# Patient Record
Sex: MALE | Race: WHITE
[De-identification: names, ages, dates, MRNs, and addresses within clinical notes are randomized per-mention and may not be internally consistent; named-entity substitution may affect disease eponyms.]

---

## 2017-09-18 ENCOUNTER — HOSPITAL ENCOUNTER (OUTPATIENT)
Dept: HOSPITAL 61 - PCVCIMAG | Age: 67
Discharge: HOME | End: 2017-09-18
Attending: INTERNAL MEDICINE
Payer: MEDICARE

## 2017-09-18 DIAGNOSIS — K52.9: ICD-10-CM

## 2017-09-18 DIAGNOSIS — E78.5: ICD-10-CM

## 2017-09-18 DIAGNOSIS — I25.10: ICD-10-CM

## 2017-09-18 DIAGNOSIS — Z79.4: ICD-10-CM

## 2017-09-18 DIAGNOSIS — I34.0: ICD-10-CM

## 2017-09-18 DIAGNOSIS — I65.23: Primary | ICD-10-CM

## 2017-09-18 DIAGNOSIS — E11.9: ICD-10-CM

## 2017-09-18 DIAGNOSIS — I10: ICD-10-CM

## 2017-09-18 PROCEDURE — 93017 CV STRESS TEST TRACING ONLY: CPT

## 2017-09-18 PROCEDURE — A9500 TC99M SESTAMIBI: HCPCS

## 2017-09-18 PROCEDURE — 93306 TTE W/DOPPLER COMPLETE: CPT

## 2017-09-18 PROCEDURE — 78452 HT MUSCLE IMAGE SPECT MULT: CPT

## 2017-09-18 PROCEDURE — 93880 EXTRACRANIAL BILAT STUDY: CPT

## 2017-09-18 NOTE — PCVCIMAG
--------------- APPROVED REPORT --------------





Exam: Nuclear Stress Test

Indication: Dyspnea, Chest pain

Patient Location: Out-Patient

Stress Nurse: Annalise Correa RN

NM Tech:YADIRA ReinosoMT



Ht: 5 ft 10 in Wt: 195 lbs BSA:  2.07 m2

HR: 61 bpm                      BP: 183/89 mmHg         BMI:  

27.9

Rhythm:  NSR



Medical History

Medical History: HTN, Hyperlipidemia, CAD, Diabetic  Insulin, CVD, 

CAROTID BRUIT, Age

Medications: ASA, Lipitor, Losartan-HCTZ

Allergies: Januvia

Previous Cardiac Procedures: PCI

Pretest Chest Pain Characteristics: No chest pain

Exercise History: Physically active

Physical Disabilities: Back



NM EXAM: Myocardial Perfusion REST/STRESS

Imaging Protocol: Rest Tc-99m/Stress Tc-99m 1 day



Resting Data

Rest SPECT myocardial perfusion imaging was performed in supine 

position 45 minutes following the intravenous injection of 11 mCi of 

Tc-99m Sestamibi.

Time of rest injection: 0930     Date: 09/18/2017

Administration Route: IV

Administration Site: Right Arm



Pharmacologic Stress

Pharmacologic stress test was performed by injecting Regadenoson 0.4 

mg IV push followed by the intravenous injection of 32.4 mCi of 

Tc-99m Sestamibi.

Time of stress injection: 1100     Date: 09/18/2017

Administration Route: IV

Administration Site: Left Arm

Gated Stress SPECT was performed 45 minutes after stress 

injection.

The images were gated to evaluate regional wall motion and calculate 

left ventricular ejection fraction. 



Study Quality

Study: Good



Study Data

Post stress, the left ventricular ejection was 67%..

SSS: 0

SRS: 0

SDS: 0

TID = 1.05.



Perfusion

No evidence of stress induced ischemia or prior myocardial 

infarction.



Wall Motion

Normal left ventricular size and function with no regional wall 

motion abnormalities.



Nuclear Conclusion

No evidence of stress induced ischemia or prior myocardial 

infarction.

Normal left ventricular size and function with no regional wall 

motion abnormalities.

Post stress, the left ventricular ejection was 67%.. 

No prior study available for comparison.



Interpreted by:  Jamshid Yu MD

Electronically Approved: 09/18/2017 14:03:05



Stress Test Details

Stress Test:  Pharmacologic stress testing performed using 0.4 mg of 

regadenoson per 5 mL given IV over 10 seconds.

  Reason for pharmacologic stress test: physical 

limitation.

HR

Resting HR:            61 bpmMax Heart Rate (APMHR): 153 bpm 

Max HR Achieved:  90 bpmTarget HR (85% APMHR): 130 bpm

% of APMHR:         58

Recovery HR:            75 bpm



BP

Resting BP:  183/89 mmHg

Max BP:       151/79 mmHg



ECG

Resting ECG:  Sinus Rhythm

Stress ECG:     Sinus Rhythm, Sinus Rhythm, NSSTT 

changes

Recovery ECG: Sinus Rhythm, Sinus Bradycardia



Clinical

Reason for Termination: Completed protocol

Stress Symptoms: Abdominal discomfort

Exercise duration: 0 min 55 sec

Symptoms resolved during recovery with caffeine.

## 2017-09-18 NOTE — PCVCIMAG
--------------- APPROVED REPORT --------------





Study performed:  2017 08:10:14



EXAM: Comprehensive 2D, Doppler, and color-flow 

Echocardiogram

Patient Location: Echo lab

Status:  routine



BSA:         2.07

HR: 64 bpmBP:          118/78 mmHg

Rhythm: NSR



Other Information 

Study Quality: Adequate



Indications

Diabetes

CAD

Chest Pain



2D Dimensions

LVEF(%):  43.55 (&gt;50%)

IVSd:  11.30 (7-11mm)

LVDd:  40.59 mm

PWd:  10.95 (7-11mm)

LVDs:  31.99 (25-40mm)

Left Atrium:  37.75 (27-40mm)

Aortic Root:  33.01 mm

LV Single Plane 4CH:  60.26 %

LV Single Plane 2CH:  60.52 %Arellano's LVEF:  60.39 %

Biplane EF:  61.5 %



Volumes

Left Atrial Volume (Systole)

Single Plane 4CH:  38.41 mLSingle Plane 2CH:  58.86 mL

LA ESV Index:  23.00 mL/m2



Aortic Valve

AoV Peak Mitesh.:  1.27 m/s

AO Peak Gr.:  6.44 mmHgLVOT Max P.02 mmHg

LVOT Max V:  1.00 m/s



Mitral Valve

E/A Ratio:  0.6

MV Decel. Time:  300.85 ms

MV E Max Mitesh.:  0.53 m/s

MV A Mitesh.:  0.95 m/s

IVRT:  152.25 ms



Pulmonary Valve

PV Peak Mitesh.:  0.91 m/sPV Peak Gr.:  3.29 mmHg



Pulmonary Vein

P Vein S:    0.22 m/sP Vein A:  0.26 m/s

P Vein D:   0.27 m/sP Vein A Dur.:  162.6 msec

P Vein S/D Ratio:  0.81



Tricuspid Valve

TR Peak Mitesh.:  2.41 m/s

TR Peak Gr.:  23.25 mmHg



Left Ventricle

The left ventricle is normal size. There is normal LV segmental wall 

motion. There is normal left ventricular wall thickness. Left 

ventricular systolic function is normal. The left ventricular 

ejection fraction is within the normal range. LVEF is 55-60%. Grade I 

- abnormal relaxation pattern.



Right Ventricle

The right ventricle is normal size. The right ventricular systolic 

function is normal.



Atria

The left atrium size is normal. The right atrium size is 

normal.



Aortic Valve

The aortic valve is normal in structure. No aortic regurgitation is 

present. There is no aortic valvular stenosis.



Mitral Valve

The mitral valve is normal in structure. There is no mitral valve 

regurgitation noted. No evidence of mitral valve stenosis.



Tricuspid Valve

The tricuspid valve is normal in structure. Trace tricuspid 

regurgitation with PAP of 30 mmHg.



Pulmonic Valve

The pulmonary valve is normal in structure. There is no pulmonic 

valvular regurgitation.



Great Vessels

The aortic root is normal in size. IVC is normal in size and 

collapses with &gt;50% inspiration



Pericardium

There is no pericardial effusion.



&lt;Conclusion&gt;

The left ventricle is normal size.

Left ventricular systolic function is normal.

The left ventricular ejection fraction is within the normal 

range.

LVEF is 55-60%.

Grade I - abnormal relaxation pattern.

The right ventricle is normal size.

The left atrium size is normal.

The aortic valve is normal in structure.

There is no mitral valve regurgitation noted.

Trace tricuspid regurgitation with PAP of 30 mmHg.

There is no pericardial effusion.

## 2017-09-18 NOTE — PCVCIMAG
--------------- APPROVED REPORT --------------





Laterality: Bilateral



Patient Location: Out-Patient



Indications

Stenosis



Doppler Spectral Velocity Analysis

PSV  /  EDVPSV  /  EDV

ECA (R)     84 / 8 cm/sECA (L)     109 / 11 cm/s

dICA (R)    80 / 38 cm/sdICA (L)     76 / 35 cm/s

Ruddy (R)     61 / 26 cm/smICA (L)     115 / 47 cm/s

pICA (R)     55 / 19 cm/spICA (L)     53 / 17 cm/s

Bulb (R)        64 / 14 cm/sBulb (L)         67 / 21 cm/s

dCCA (R)     77 / 18 cm/sdCCA (L)     83 / 18 cm/s

mCCA (R)    84 / 18 cm/smCCA (L)    93 / 24 cm/s

Vert (R)     53 / 9 cm/sVert (L)     63 / 25 cm/s

ICA/CCA     ICA/CCA     



Findings

The right carotid bulb has mild calcified plaque.

The right proximal internal carotid artery shows &lt;40% 

stenosis.

The right common carotid artery shows no significant stenosis.

The right external carotid artery shows no significant stenosis.

The left carotid bulb has mild  plaque.

The left mid internal carotid artery shows &lt;40% stenosis.

The left common carotid artery shows no significant stenosis.

The left external carotid artery shows no significant 

stenosis.



Conclusion

1.  Right internal carotid artery stenosis (&lt;40%)

2.  Left mid internal carotid artery stenosis (&lt;40%)

3.  Antegrade vertebral flow

## 2018-08-08 ENCOUNTER — HOSPITAL ENCOUNTER (OUTPATIENT)
Dept: HOSPITAL 61 - PCVCCLINIC | Age: 68
Discharge: HOME | End: 2018-08-08
Attending: INTERNAL MEDICINE
Payer: MEDICARE

## 2018-08-08 DIAGNOSIS — I25.10: Primary | ICD-10-CM

## 2018-08-08 DIAGNOSIS — I10: ICD-10-CM

## 2018-08-08 DIAGNOSIS — E11.9: ICD-10-CM

## 2018-08-08 DIAGNOSIS — Z79.82: ICD-10-CM

## 2018-08-08 DIAGNOSIS — Z79.4: ICD-10-CM

## 2018-08-08 DIAGNOSIS — E78.00: ICD-10-CM

## 2018-08-08 DIAGNOSIS — I77.9: ICD-10-CM

## 2018-08-08 PROCEDURE — 93005 ELECTROCARDIOGRAM TRACING: CPT

## 2019-04-10 ENCOUNTER — HOSPITAL ENCOUNTER (OUTPATIENT)
Dept: HOSPITAL 61 - PCVCCLINIC | Age: 69
Discharge: HOME | End: 2019-04-10
Attending: INTERNAL MEDICINE
Payer: MEDICARE

## 2019-04-10 DIAGNOSIS — M54.5: ICD-10-CM

## 2019-04-10 DIAGNOSIS — Z88.8: ICD-10-CM

## 2019-04-10 DIAGNOSIS — E11.9: ICD-10-CM

## 2019-04-10 DIAGNOSIS — I10: ICD-10-CM

## 2019-04-10 DIAGNOSIS — E78.00: ICD-10-CM

## 2019-04-10 DIAGNOSIS — I25.10: Primary | ICD-10-CM

## 2019-04-10 DIAGNOSIS — G89.29: ICD-10-CM

## 2019-04-10 DIAGNOSIS — Z79.4: ICD-10-CM

## 2019-04-10 DIAGNOSIS — R94.31: ICD-10-CM

## 2019-04-10 DIAGNOSIS — Z79.899: ICD-10-CM

## 2019-04-10 PROCEDURE — 93005 ELECTROCARDIOGRAM TRACING: CPT

## 2019-04-10 PROCEDURE — 80061 LIPID PANEL: CPT

## 2019-04-10 PROCEDURE — G0463 HOSPITAL OUTPT CLINIC VISIT: HCPCS

## 2019-04-10 PROCEDURE — 36415 COLL VENOUS BLD VENIPUNCTURE: CPT

## 2019-11-21 ENCOUNTER — HOSPITAL ENCOUNTER (OUTPATIENT)
Dept: HOSPITAL 61 - PCVCCLINIC | Age: 69
Discharge: HOME | End: 2019-11-21
Attending: INTERNAL MEDICINE
Payer: MEDICARE

## 2019-11-21 DIAGNOSIS — Z79.4: ICD-10-CM

## 2019-11-21 DIAGNOSIS — I25.10: Primary | ICD-10-CM

## 2019-11-21 DIAGNOSIS — E78.00: ICD-10-CM

## 2019-11-21 DIAGNOSIS — Z88.8: ICD-10-CM

## 2019-11-21 DIAGNOSIS — E11.9: ICD-10-CM

## 2019-11-21 DIAGNOSIS — Z79.899: ICD-10-CM

## 2019-11-21 DIAGNOSIS — R09.89: ICD-10-CM

## 2019-11-21 DIAGNOSIS — I10: ICD-10-CM

## 2019-11-21 PROCEDURE — 80061 LIPID PANEL: CPT

## 2019-11-21 PROCEDURE — G0463 HOSPITAL OUTPT CLINIC VISIT: HCPCS

## 2019-11-21 PROCEDURE — 93005 ELECTROCARDIOGRAM TRACING: CPT

## 2019-11-21 PROCEDURE — 36415 COLL VENOUS BLD VENIPUNCTURE: CPT
